# Patient Record
Sex: MALE | Race: BLACK OR AFRICAN AMERICAN | NOT HISPANIC OR LATINO | Employment: OTHER | URBAN - METROPOLITAN AREA
[De-identification: names, ages, dates, MRNs, and addresses within clinical notes are randomized per-mention and may not be internally consistent; named-entity substitution may affect disease eponyms.]

---

## 2024-08-06 ENCOUNTER — APPOINTMENT (OUTPATIENT)
Dept: PRIMARY CARE | Facility: CLINIC | Age: 39
End: 2024-08-06
Payer: COMMERCIAL

## 2025-01-14 ENCOUNTER — APPOINTMENT (OUTPATIENT)
Dept: PRIMARY CARE | Facility: CLINIC | Age: 40
End: 2025-01-14
Payer: MEDICAID

## 2025-01-14 VITALS
DIASTOLIC BLOOD PRESSURE: 80 MMHG | OXYGEN SATURATION: 95 % | BODY MASS INDEX: 44.09 KG/M2 | SYSTOLIC BLOOD PRESSURE: 134 MMHG | WEIGHT: 308 LBS | HEART RATE: 71 BPM | HEIGHT: 70 IN

## 2025-01-14 DIAGNOSIS — Z11.4 SCREENING FOR HIV (HUMAN IMMUNODEFICIENCY VIRUS): ICD-10-CM

## 2025-01-14 DIAGNOSIS — Z00.00 HEALTHCARE MAINTENANCE: Primary | ICD-10-CM

## 2025-01-14 DIAGNOSIS — G40.909 RECURRENT SEIZURES (MULTI): ICD-10-CM

## 2025-01-14 DIAGNOSIS — Z68.55 SEVERE OBESITY DUE TO EXCESS CALORIES WITH SERIOUS COMORBIDITY AND BODY MASS INDEX (BMI) 120% OF 95TH PERCENTILE TO LESS THAN 140% OF 95TH PERCENTILE FOR AGE IN PEDIATRIC PATIENT: ICD-10-CM

## 2025-01-14 DIAGNOSIS — E66.01 SEVERE OBESITY DUE TO EXCESS CALORIES WITH SERIOUS COMORBIDITY AND BODY MASS INDEX (BMI) 120% OF 95TH PERCENTILE TO LESS THAN 140% OF 95TH PERCENTILE FOR AGE IN PEDIATRIC PATIENT: ICD-10-CM

## 2025-01-14 DIAGNOSIS — Z11.59 NEED FOR HEPATITIS C SCREENING TEST: ICD-10-CM

## 2025-01-14 DIAGNOSIS — E55.9 HYPOVITAMINOSIS D: ICD-10-CM

## 2025-01-14 DIAGNOSIS — T78.40XS ALLERGY, SEQUELA: ICD-10-CM

## 2025-01-14 DIAGNOSIS — Z12.5 PROSTATE CANCER SCREENING: ICD-10-CM

## 2025-01-14 PROCEDURE — 99385 PREV VISIT NEW AGE 18-39: CPT | Performed by: INTERNAL MEDICINE

## 2025-01-14 PROCEDURE — 1036F TOBACCO NON-USER: CPT | Performed by: INTERNAL MEDICINE

## 2025-01-14 PROCEDURE — 3008F BODY MASS INDEX DOCD: CPT | Performed by: INTERNAL MEDICINE

## 2025-01-14 PROCEDURE — 99203 OFFICE O/P NEW LOW 30 MIN: CPT | Performed by: INTERNAL MEDICINE

## 2025-01-14 RX ORDER — FLUTICASONE PROPIONATE 50 MCG
1 SPRAY, SUSPENSION (ML) NASAL DAILY
Qty: 16 G | Refills: 3 | Status: SHIPPED | OUTPATIENT
Start: 2025-01-14

## 2025-01-14 RX ORDER — FLUTICASONE PROPIONATE 50 MCG
1 SPRAY, SUSPENSION (ML) NASAL 2 TIMES DAILY
COMMUNITY
Start: 2024-12-16 | End: 2025-01-14 | Stop reason: SDUPTHER

## 2025-01-14 RX ORDER — LORATADINE 10 MG/1
10 TABLET ORAL DAILY
Qty: 90 TABLET | Refills: 2 | Status: SHIPPED | OUTPATIENT
Start: 2025-01-14 | End: 2025-04-14

## 2025-01-14 ASSESSMENT — PAIN SCALES - GENERAL: PAINLEVEL_OUTOF10: 0-NO PAIN

## 2025-01-14 ASSESSMENT — ENCOUNTER SYMPTOMS
AGITATION: 0
CHILLS: 0
SEIZURES: 1
ACTIVITY CHANGE: 0
PALPITATIONS: 0
WEAKNESS: 0
MYALGIAS: 0
DIARRHEA: 0
CHEST TIGHTNESS: 0
DEPRESSION: 0
NAUSEA: 0
SHORTNESS OF BREATH: 0

## 2025-01-14 NOTE — PROGRESS NOTES
"Subjective   Patient ID: Reggie Hudson is a 39 y.o. male who presents for Establish Care and Annual Exam.  The patient has been doing fairly well overall and has not had any recent problems with his allergies.  He has a history of seizure disorder that is fairly well-controlled and he gets his medicine from his neurologist.  HPI     Review of Systems   Constitutional:  Negative for activity change and chills.   HENT:  Negative for congestion.    Respiratory:  Negative for chest tightness and shortness of breath.    Cardiovascular:  Negative for chest pain and palpitations.   Gastrointestinal:  Negative for diarrhea and nausea.   Musculoskeletal:  Negative for myalgias.   Neurological:  Positive for seizures. Negative for weakness.   Psychiatric/Behavioral:  Negative for agitation and behavioral problems.        Objective   /80 (BP Location: Right arm, Patient Position: Sitting, BP Cuff Size: Large adult)   Pulse 71   Ht 1.778 m (5' 10\")   Wt 140 kg (308 lb)   SpO2 95%   BMI 44.19 kg/m²     Physical Exam  Constitutional:       Appearance: Normal appearance.   HENT:      Head: Normocephalic and atraumatic.      Nose: Nose normal.      Mouth/Throat:      Mouth: Mucous membranes are moist.   Eyes:      Extraocular Movements: Extraocular movements intact.   Cardiovascular:      Rate and Rhythm: Normal rate and regular rhythm.   Pulmonary:      Effort: No respiratory distress.      Breath sounds: No wheezing.   Abdominal:      General: Bowel sounds are normal.      Palpations: Abdomen is soft.   Neurological:      General: No focal deficit present.       Assessment/Plan   Assessment & Plan  Healthcare maintenance    Orders:    Lipid Panel; Future    Recurrent seizures (Multi)  No recent events.   Orders:    Comprehensive Metabolic Panel; Future    fluticasone (Flonase) 50 mcg/actuation nasal spray; Administer 1 spray into each nostril once daily.    loratadine (Claritin) 10 mg tablet; Take 1 tablet (10 mg) by " mouth once daily.    Allergy, sequela  We will add Claritin for prophylaxis when the weather warms.       Screening for HIV (human immunodeficiency virus)    Orders:    HIV 1/2 Antigen/Antibody Screen with Reflex to Confirmation; Future    Hypovitaminosis D  We will screen for low vitamin D, as it was low in the past.  Orders:    Vitamin D 25-Hydroxy,Total (for eval of Vitamin D levels); Future    Need for hepatitis C screening test    Orders:    Hepatitis C Antibody; Future    Prostate cancer screening    Orders:    Prostate Specific Antigen; Future    Severe obesity due to excess calories with serious comorbidity and body mass index (BMI) 120% of 95th percentile to less than 140% of 95th percentile for age in pediatric patient    Orders:    Referral to Nutrition Services; Future

## 2025-01-14 NOTE — ASSESSMENT & PLAN NOTE
No recent events.   Orders:    Comprehensive Metabolic Panel; Future    fluticasone (Flonase) 50 mcg/actuation nasal spray; Administer 1 spray into each nostril once daily.    loratadine (Claritin) 10 mg tablet; Take 1 tablet (10 mg) by mouth once daily.